# Patient Record
Sex: FEMALE | Race: WHITE | NOT HISPANIC OR LATINO
[De-identification: names, ages, dates, MRNs, and addresses within clinical notes are randomized per-mention and may not be internally consistent; named-entity substitution may affect disease eponyms.]

---

## 2018-01-10 ENCOUNTER — TRANSCRIPTION ENCOUNTER (OUTPATIENT)
Age: 33
End: 2018-01-10

## 2018-02-07 ENCOUNTER — TRANSCRIPTION ENCOUNTER (OUTPATIENT)
Age: 33
End: 2018-02-07

## 2018-08-08 ENCOUNTER — APPOINTMENT (OUTPATIENT)
Dept: DERMATOLOGY | Facility: CLINIC | Age: 33
End: 2018-08-08
Payer: COMMERCIAL

## 2018-08-08 VITALS
BODY MASS INDEX: 24.15 KG/M2 | HEIGHT: 60 IN | SYSTOLIC BLOOD PRESSURE: 98 MMHG | WEIGHT: 123 LBS | DIASTOLIC BLOOD PRESSURE: 66 MMHG

## 2018-08-08 DIAGNOSIS — L81.9 DISORDER OF PIGMENTATION, UNSPECIFIED: ICD-10-CM

## 2018-08-08 DIAGNOSIS — Z87.09 PERSONAL HISTORY OF OTHER DISEASES OF THE RESPIRATORY SYSTEM: ICD-10-CM

## 2018-08-08 DIAGNOSIS — Z91.89 OTHER SPECIFIED PERSONAL RISK FACTORS, NOT ELSEWHERE CLASSIFIED: ICD-10-CM

## 2018-08-08 DIAGNOSIS — L81.0 POSTINFLAMMATORY HYPERPIGMENTATION: ICD-10-CM

## 2018-08-08 DIAGNOSIS — D22.9 MELANOCYTIC NEVI, UNSPECIFIED: ICD-10-CM

## 2018-08-08 DIAGNOSIS — D22.4 MELANOCYTIC NEVI OF SCALP AND NECK: ICD-10-CM

## 2018-08-08 DIAGNOSIS — L81.2 FRECKLES: ICD-10-CM

## 2018-08-08 DIAGNOSIS — Z84.0 FAMILY HISTORY OF DISEASES OF THE SKIN AND SUBCUTANEOUS TISSUE: ICD-10-CM

## 2018-08-08 PROCEDURE — 99203 OFFICE O/P NEW LOW 30 MIN: CPT

## 2018-11-20 ENCOUNTER — TRANSCRIPTION ENCOUNTER (OUTPATIENT)
Age: 33
End: 2018-11-20

## 2019-01-08 ENCOUNTER — APPOINTMENT (OUTPATIENT)
Dept: PLASTIC SURGERY | Facility: CLINIC | Age: 34
End: 2019-01-08
Payer: COMMERCIAL

## 2019-01-08 VITALS — WEIGHT: 123 LBS | BODY MASS INDEX: 24.15 KG/M2 | HEIGHT: 60 IN

## 2019-01-08 PROCEDURE — 99203 OFFICE O/P NEW LOW 30 MIN: CPT

## 2019-03-28 ENCOUNTER — APPOINTMENT (OUTPATIENT)
Dept: PLASTIC SURGERY | Facility: CLINIC | Age: 34
End: 2019-03-28
Payer: COMMERCIAL

## 2019-03-28 DIAGNOSIS — H61.119 ACQUIRED DEFORMITY OF PINNA, UNSPECIFIED EAR: ICD-10-CM

## 2019-03-28 PROCEDURE — 14060 TIS TRNFR E/N/E/L 10 SQ CM/<: CPT

## 2019-04-04 ENCOUNTER — APPOINTMENT (OUTPATIENT)
Dept: PLASTIC SURGERY | Facility: CLINIC | Age: 34
End: 2019-04-04
Payer: COMMERCIAL

## 2019-04-04 PROCEDURE — 99024 POSTOP FOLLOW-UP VISIT: CPT

## 2019-04-04 NOTE — HISTORY OF PRESENT ILLNESS
[FreeTextEntry1] : DOP 03/28/2019 s/p reconstruction of Right torn earlobe. Pt is doing well; sutures in place.\par No complaints. SIte healing well per pt. No excessive bleeding. No fevers. No odor. No purulent discharge. No excessive pain.\par \par  \par

## 2019-04-11 PROBLEM — H61.119 ACQUIRED EAR DEFORMITY: Status: ACTIVE | Noted: 2019-01-08

## 2019-04-11 NOTE — PROCEDURE
[FreeTextEntry6] : preop dx: right ear deformity\par postop : same\par procedure: left ear reconstruction with local flap, 1cm x 8mm\par no specimens\par no abx\par \par IC obtained.  lido w/ epi injected.  15 blade used to de-epithelialize wound edges.  chondrocutaneous lateral portion of pinna advanced into defect [id number].  deep tissues closed with 5-0 monocryl.  skin reapproximated with 6-0 prolene horizontal mattresses.  baci placed.\par

## 2019-04-11 NOTE — REASON FOR VISIT
[Procedure: _________] : a [unfilled] procedure visit [FreeTextEntry1] : Patient presents to the office today for reconstruction of Right earlobe

## 2019-04-23 ENCOUNTER — RESULT REVIEW (OUTPATIENT)
Age: 34
End: 2019-04-23

## 2019-06-24 ENCOUNTER — TRANSCRIPTION ENCOUNTER (OUTPATIENT)
Age: 34
End: 2019-06-24

## 2019-09-25 ENCOUNTER — APPOINTMENT (OUTPATIENT)
Dept: INTERNAL MEDICINE | Facility: CLINIC | Age: 34
End: 2019-09-25

## 2019-10-18 ENCOUNTER — TRANSCRIPTION ENCOUNTER (OUTPATIENT)
Age: 34
End: 2019-10-18

## 2020-01-22 ENCOUNTER — TRANSCRIPTION ENCOUNTER (OUTPATIENT)
Age: 35
End: 2020-01-22

## 2020-04-25 ENCOUNTER — MESSAGE (OUTPATIENT)
Age: 35
End: 2020-04-25

## 2020-06-09 ENCOUNTER — APPOINTMENT (OUTPATIENT)
Dept: FAMILY MEDICINE | Facility: CLINIC | Age: 35
End: 2020-06-09

## 2020-06-19 ENCOUNTER — APPOINTMENT (OUTPATIENT)
Dept: FAMILY MEDICINE | Facility: CLINIC | Age: 35
End: 2020-06-19
Payer: COMMERCIAL

## 2020-06-19 VITALS
DIASTOLIC BLOOD PRESSURE: 70 MMHG | BODY MASS INDEX: 24.94 KG/M2 | SYSTOLIC BLOOD PRESSURE: 100 MMHG | HEART RATE: 68 BPM | OXYGEN SATURATION: 98 % | WEIGHT: 127 LBS | HEIGHT: 60 IN

## 2020-06-19 DIAGNOSIS — Z92.89 PERSONAL HISTORY OF OTHER MEDICAL TREATMENT: ICD-10-CM

## 2020-06-19 DIAGNOSIS — Z11.59 ENCOUNTER FOR SCREENING FOR OTHER VIRAL DISEASES: ICD-10-CM

## 2020-06-19 PROCEDURE — 99385 PREV VISIT NEW AGE 18-39: CPT | Mod: 25

## 2020-06-19 PROCEDURE — 36415 COLL VENOUS BLD VENIPUNCTURE: CPT

## 2020-06-19 NOTE — HISTORY OF PRESENT ILLNESS
[de-identified] : 35 year old female here to establish care and for a full physical examination. The patients complete medical, family and social history was documented and reviewed with the patient.  The patients medications were identified and also reviewed with the patient as well as any allergies to any medications. All active and previous medical problems were identified and discussed with the patient.  Any new problems were documented. Patient is feeling well today.\par

## 2020-06-19 NOTE — PHYSICAL EXAM
[Well Developed] : well developed [Well Nourished] : well nourished [Clear to Auscultation] : lungs were clear to auscultation bilaterally [Normal S1, S2] : normal S1 and S2 [Regular Rhythm] : with a regular rhythm [No Joint Swelling] : no joint swelling [Normal Gait] : normal gait [Normal Affect] : the affect was normal [Normal Insight/Judgement] : insight and judgment were intact

## 2020-06-19 NOTE — ASSESSMENT
[FreeTextEntry1] : Patient was counseled on healthy eating habits, on daily exercise and stress relief. All medications and allergies were reviewed with the patient and any adjustments necessary were made. Patient was counseled to try engage in an exercise activity for at least 30 minutes 3-4 times a week.  Patient was advised to eat a diet low in fat and carbohydrates and high in protein, with plenty of vegetables. Patient was advised to try and engage in relaxing activities whenever possible.\par The patients blood was draw in office and will be followed and assessed for any issues.  Patient was told to return to the office if any issues arise.  Unless otherwise stated, the patient is to continue all other medications as previously prescribed.\par \par history of anemia\par secondary to hemorrhoids \par will recheck

## 2020-06-19 NOTE — HEALTH RISK ASSESSMENT
[Excellent] : ~his/her~ current health as excellent [] : No [Yes] : Yes [No falls in past year] : Patient reported no falls in the past year [0] : 2) Feeling down, depressed, or hopeless: Not at all (0) [[id number]] : 0 [Patient reported colonoscopy was normal] : Patient reported colonoscopy was normal [With Significant Other] : lives with significant other [None] : None [Employed] : employed [Fully functional (bathing, dressing, toileting, transferring, walking, feeding)] : Fully functional (bathing, dressing, toileting, transferring, walking, feeding) [# Of Children ___] : has [unfilled] children [Fully functional (using the telephone, shopping, preparing meals, housekeeping, doing laundry, using] : Fully functional and needs no help or supervision to perform IADLs (using the telephone, shopping, preparing meals, housekeeping, doing laundry, using transportation, managing medications and managing finances) [Smoke Detector] : smoke detector [de-identified] : tia for [facility] [Seat Belt] :  uses seat belt [ColonoscopyDate] : 6/2019 [FreeTextEntry3] : engaged

## 2020-06-20 LAB
ALBUMIN SERPL ELPH-MCNC: 4.8 G/DL
ALP BLD-CCNC: 60 U/L
ALT SERPL-CCNC: 17 U/L
ANION GAP SERPL CALC-SCNC: 17 MMOL/L
APPEARANCE: CLEAR
AST SERPL-CCNC: 23 U/L
BASOPHILS # BLD AUTO: 0.04 K/UL
BASOPHILS NFR BLD AUTO: 0.8 %
BILIRUB SERPL-MCNC: 0.5 MG/DL
BILIRUBIN URINE: NEGATIVE
BLOOD URINE: NEGATIVE
BUN SERPL-MCNC: 12 MG/DL
CALCIUM SERPL-MCNC: 9.4 MG/DL
CHLORIDE SERPL-SCNC: 105 MMOL/L
CHOLEST SERPL-MCNC: 188 MG/DL
CHOLEST/HDLC SERPL: 2.4 RATIO
CO2 SERPL-SCNC: 21 MMOL/L
COLOR: YELLOW
CREAT SERPL-MCNC: 0.66 MG/DL
EOSINOPHIL # BLD AUTO: 0.04 K/UL
EOSINOPHIL NFR BLD AUTO: 0.8 %
FERRITIN SERPL-MCNC: 11 NG/ML
FOLATE SERPL-MCNC: 16.8 NG/ML
GLUCOSE QUALITATIVE U: NEGATIVE
GLUCOSE SERPL-MCNC: 85 MG/DL
HCT VFR BLD CALC: 44.3 %
HDLC SERPL-MCNC: 78 MG/DL
HGB BLD-MCNC: 13.6 G/DL
IMM GRANULOCYTES NFR BLD AUTO: 0.2 %
IRON SATN MFR SERPL: 19 %
IRON SERPL-MCNC: 74 UG/DL
KETONES URINE: NEGATIVE
LDLC SERPL CALC-MCNC: 101 MG/DL
LEUKOCYTE ESTERASE URINE: NEGATIVE
LYMPHOCYTES # BLD AUTO: 1.21 K/UL
LYMPHOCYTES NFR BLD AUTO: 24.5 %
MAN DIFF?: NORMAL
MCHC RBC-ENTMCNC: 30.1 PG
MCHC RBC-ENTMCNC: 30.7 GM/DL
MCV RBC AUTO: 98 FL
MONOCYTES # BLD AUTO: 0.47 K/UL
MONOCYTES NFR BLD AUTO: 9.5 %
NEUTROPHILS # BLD AUTO: 3.16 K/UL
NEUTROPHILS NFR BLD AUTO: 64.2 %
NITRITE URINE: NEGATIVE
PH URINE: 6.5
PLATELET # BLD AUTO: 221 K/UL
POTASSIUM SERPL-SCNC: 4.5 MMOL/L
PROT SERPL-MCNC: 7.1 G/DL
PROTEIN URINE: NORMAL
RBC # BLD: 4.52 M/UL
RBC # FLD: 14.9 %
SARS-COV-2 IGG SERPL IA-ACNC: 0.1 INDEX
SARS-COV-2 IGG SERPL QL IA: NEGATIVE
SODIUM SERPL-SCNC: 143 MMOL/L
SPECIFIC GRAVITY URINE: 1.03
TIBC SERPL-MCNC: 396 UG/DL
TRIGL SERPL-MCNC: 48 MG/DL
TSH SERPL-ACNC: 1.08 UIU/ML
UIBC SERPL-MCNC: 322 UG/DL
UROBILINOGEN URINE: NORMAL
VIT B12 SERPL-MCNC: 410 PG/ML
WBC # FLD AUTO: 4.93 K/UL

## 2020-11-11 ENCOUNTER — TRANSCRIPTION ENCOUNTER (OUTPATIENT)
Age: 35
End: 2020-11-11

## 2020-11-16 ENCOUNTER — APPOINTMENT (OUTPATIENT)
Dept: FAMILY MEDICINE | Facility: CLINIC | Age: 35
End: 2020-11-16

## 2020-12-07 ENCOUNTER — APPOINTMENT (OUTPATIENT)
Dept: FAMILY MEDICINE | Facility: CLINIC | Age: 35
End: 2020-12-07
Payer: COMMERCIAL

## 2020-12-07 PROCEDURE — 99213 OFFICE O/P EST LOW 20 MIN: CPT | Mod: 95

## 2020-12-07 NOTE — REVIEW OF SYSTEMS
[Earache] : earache [Nasal Discharge] : nasal discharge [Sore Throat] : no sore throat [Negative] : Heme/Lymph

## 2020-12-07 NOTE — HEALTH RISK ASSESSMENT
[] : No [Yes] : Yes [No falls in past year] : Patient reported no falls in the past year [0] : 2) Feeling down, depressed, or hopeless: Not at all (0) [[id number]] : 0 [Excellent] : ~his/her~  mood as  excellent [Patient reported colonoscopy was normal] : Patient reported colonoscopy was normal [None] : None [With Significant Other] : lives with significant other [Employed] : employed [# Of Children ___] : has [unfilled] children [Fully functional (bathing, dressing, toileting, transferring, walking, feeding)] : Fully functional (bathing, dressing, toileting, transferring, walking, feeding) [Fully functional (using the telephone, shopping, preparing meals, housekeeping, doing laundry, using] : Fully functional and needs no help or supervision to perform IADLs (using the telephone, shopping, preparing meals, housekeeping, doing laundry, using transportation, managing medications and managing finances) [Smoke Detector] : smoke detector [Seat Belt] :  uses seat belt [ColonoscopyDate] : 6/2019 [de-identified] : tia for [facility] [FreeTextEntry3] : engaged

## 2020-12-07 NOTE — HISTORY OF PRESENT ILLNESS
[Home] : at home, [unfilled] , at the time of the visit. [Medical Office: ([facility])___] : at the medical office located in  [Verbal consent obtained from patient] : the patient, [unfilled] [de-identified] : 35 year old female here with complaints of throat soreness, ear pain. Patients active medications, allergies and issues were all reviewed with the patient at time of visit.\par

## 2020-12-07 NOTE — ASSESSMENT
[FreeTextEntry1] : uri\par The symptoms that are occurring are most likely secondary to virus, therefore antibiotics are deferred at this time. Patient was told to rest, hydrate and treat symptoms as necessary. May use tylenol/ibuprofen as necessary for symptomatic relief.  If symptoms worsen or do not improve return to this office, seek care or go directly to the ER.\par

## 2020-12-07 NOTE — PHYSICAL EXAM
[No Acute Distress] : no acute distress [Well Nourished] : well nourished [Well Developed] : well developed [Normal Gait] : normal gait [Normal Affect] : the affect was normal [Normal Insight/Judgement] : insight and judgment were intact

## 2020-12-12 ENCOUNTER — TRANSCRIPTION ENCOUNTER (OUTPATIENT)
Age: 35
End: 2020-12-12

## 2020-12-15 ENCOUNTER — APPOINTMENT (OUTPATIENT)
Dept: FAMILY MEDICINE | Facility: CLINIC | Age: 35
End: 2020-12-15
Payer: COMMERCIAL

## 2020-12-15 PROCEDURE — 99213 OFFICE O/P EST LOW 20 MIN: CPT | Mod: 95

## 2020-12-15 RX ORDER — METHYLPREDNISOLONE 4 MG/1
4 TABLET ORAL
Qty: 21 | Refills: 0 | Status: DISCONTINUED | COMMUNITY
Start: 2020-12-07 | End: 2020-12-15

## 2020-12-15 NOTE — HEALTH RISK ASSESSMENT
[] : No [Yes] : Yes [No falls in past year] : Patient reported no falls in the past year [0] : 2) Feeling down, depressed, or hopeless: Not at all (0) [[id number]] : 0 [Excellent] : ~his/her~  mood as  excellent [Patient reported colonoscopy was normal] : Patient reported colonoscopy was normal [None] : None [With Significant Other] : lives with significant other [Employed] : employed [# Of Children ___] : has [unfilled] children [Fully functional (bathing, dressing, toileting, transferring, walking, feeding)] : Fully functional (bathing, dressing, toileting, transferring, walking, feeding) [Fully functional (using the telephone, shopping, preparing meals, housekeeping, doing laundry, using] : Fully functional and needs no help or supervision to perform IADLs (using the telephone, shopping, preparing meals, housekeeping, doing laundry, using transportation, managing medications and managing finances) [Smoke Detector] : smoke detector [Seat Belt] :  uses seat belt [ColonoscopyDate] : 6/2019 [de-identified] : tia for [facility] [FreeTextEntry3] : engaged

## 2020-12-15 NOTE — REVIEW OF SYSTEMS
[Earache] : earache [Nasal Discharge] : nasal discharge [Sore Throat] : sore throat [Negative] : Heme/Lymph

## 2020-12-15 NOTE — HISTORY OF PRESENT ILLNESS
[de-identified] : 35 year old female here with complaints of throat soreness, ear pain, tested positive for covid. Patients active medications, allergies and issues were all reviewed with the patient at time of visit.\par  [Home] : at home, [unfilled] , at the time of the visit. [Medical Office: ([facility])___] : at the medical office located in  [Verbal consent obtained from patient] : the patient, [unfilled]

## 2020-12-15 NOTE — ASSESSMENT
[FreeTextEntry1] : Patient called with symptoms that can be associated with the COVID-19 virus, tested positive 12/10/20\par went to the ER, was diagnosed with abscess of her throat, covid 19\par \par Patient was advised at this time, to use symptomatic treatment.  Use Tylenol for fever and body aches.  Hydrate throughout the day.  Use cough medicine as needed. \par Patient advised to self isolate, avoid contact with others, stay home for 14 days\par \par augmentin \par will add on prednisone\par \par  All concerns were addressed and pertinent questions answered. If symptoms progress and become unmanageable at home, patient was advised to call the office for reassessment or to go directly to the ER for full assessment.\par \par .\par

## 2020-12-22 ENCOUNTER — APPOINTMENT (OUTPATIENT)
Dept: FAMILY MEDICINE | Facility: CLINIC | Age: 35
End: 2020-12-22
Payer: COMMERCIAL

## 2020-12-22 PROCEDURE — 99213 OFFICE O/P EST LOW 20 MIN: CPT | Mod: 95

## 2020-12-22 NOTE — HEALTH RISK ASSESSMENT
[] : No [Yes] : Yes [No falls in past year] : Patient reported no falls in the past year [0] : 2) Feeling down, depressed, or hopeless: Not at all (0) [[id number]] : 0 [Excellent] : ~his/her~  mood as  excellent [Patient reported colonoscopy was normal] : Patient reported colonoscopy was normal [None] : None [With Significant Other] : lives with significant other [Employed] : employed [# Of Children ___] : has [unfilled] children [Fully functional (bathing, dressing, toileting, transferring, walking, feeding)] : Fully functional (bathing, dressing, toileting, transferring, walking, feeding) [Fully functional (using the telephone, shopping, preparing meals, housekeeping, doing laundry, using] : Fully functional and needs no help or supervision to perform IADLs (using the telephone, shopping, preparing meals, housekeeping, doing laundry, using transportation, managing medications and managing finances) [Smoke Detector] : smoke detector [Seat Belt] :  uses seat belt [ColonoscopyDate] : 6/2019 [de-identified] : tia for [facility] [FreeTextEntry3] : engaged

## 2020-12-22 NOTE — HISTORY OF PRESENT ILLNESS
[de-identified] : 35 year old female here with complaints of throat soreness, ear pain, tested positive for covid, finishing medications and wanted to follow up. Patients active medications, allergies and issues were all reviewed with the patient at time of visit.\par  [Home] : at home, [unfilled] , at the time of the visit. [Medical Office: ([facility])___] : at the medical office located in  [Verbal consent obtained from patient] : the patient, [unfilled]

## 2020-12-22 NOTE — ASSESSMENT
[FreeTextEntry1] : Patient called with symptoms that can be associated with the COVID-19 virus, tested positive 12/10/20\par went to the ER, was diagnosed with abscess of her throat, covid 19\par \par finished augmentin and prednisone\par \par feeling better, may return to work\par fu with ent\par .\par

## 2021-01-08 ENCOUNTER — APPOINTMENT (OUTPATIENT)
Dept: OTOLARYNGOLOGY | Facility: CLINIC | Age: 36
End: 2021-01-08

## 2021-06-11 ENCOUNTER — APPOINTMENT (OUTPATIENT)
Dept: DERMATOLOGY | Facility: CLINIC | Age: 36
End: 2021-06-11
Payer: COMMERCIAL

## 2021-06-11 DIAGNOSIS — L65.9 NONSCARRING HAIR LOSS, UNSPECIFIED: ICD-10-CM

## 2021-06-11 DIAGNOSIS — L30.9 DERMATITIS, UNSPECIFIED: ICD-10-CM

## 2021-06-11 PROCEDURE — 99213 OFFICE O/P EST LOW 20 MIN: CPT | Mod: GC

## 2021-06-11 PROCEDURE — 99072 ADDL SUPL MATRL&STAF TM PHE: CPT

## 2021-07-09 ENCOUNTER — APPOINTMENT (OUTPATIENT)
Dept: DERMATOLOGY | Facility: CLINIC | Age: 36
End: 2021-07-09

## 2021-10-07 ENCOUNTER — APPOINTMENT (OUTPATIENT)
Dept: DERMATOLOGY | Facility: CLINIC | Age: 36
End: 2021-10-07

## 2021-10-13 ENCOUNTER — APPOINTMENT (OUTPATIENT)
Dept: FAMILY MEDICINE | Facility: CLINIC | Age: 36
End: 2021-10-13
Payer: COMMERCIAL

## 2021-10-13 VITALS
HEIGHT: 60 IN | WEIGHT: 128 LBS | SYSTOLIC BLOOD PRESSURE: 102 MMHG | TEMPERATURE: 98.6 F | HEART RATE: 74 BPM | DIASTOLIC BLOOD PRESSURE: 72 MMHG | BODY MASS INDEX: 25.13 KG/M2 | OXYGEN SATURATION: 97 %

## 2021-10-13 PROCEDURE — 99213 OFFICE O/P EST LOW 20 MIN: CPT | Mod: 25

## 2021-10-13 NOTE — HEALTH RISK ASSESSMENT
[] : No [Yes] : Yes [No falls in past year] : Patient reported no falls in the past year [0] : 2) Feeling down, depressed, or hopeless: Not at all (0) [XME6Mmcrc] : 0 [Excellent] : ~his/her~  mood as  excellent [Patient reported colonoscopy was normal] : Patient reported colonoscopy was normal [None] : None [With Significant Other] : lives with significant other [Employed] : employed [# Of Children ___] : has [unfilled] children [Fully functional (bathing, dressing, toileting, transferring, walking, feeding)] : Fully functional (bathing, dressing, toileting, transferring, walking, feeding) [Fully functional (using the telephone, shopping, preparing meals, housekeeping, doing laundry, using] : Fully functional and needs no help or supervision to perform IADLs (using the telephone, shopping, preparing meals, housekeeping, doing laundry, using transportation, managing medications and managing finances) [Smoke Detector] : smoke detector [Seat Belt] :  uses seat belt [ColonoscopyDate] : 6/2019 [de-identified] : tia for [facility] [FreeTextEntry3] : engaged

## 2021-10-13 NOTE — HISTORY OF PRESENT ILLNESS
[de-identified] : 36 year old female here with complaints of sore throat and congestion for two days.  Patients active medications, allergies and issues were all reviewed with the patient at time of visit.\par

## 2021-10-13 NOTE — ASSESSMENT
[FreeTextEntry1] : previous covid;  tested positive 12/10/20\par \par uri\par The symptoms that are occurring are most likely secondary to virus, therefore antibiotics are deferred at this time. Patient was told to rest, hydrate and treat symptoms as necessary. May use tylenol/ibuprofen as necessary for symptomatic relief.  If symptoms worsen or do not improve return to this office, seek care or go directly to the ER.\par \par swab done for covid

## 2021-10-14 LAB — SARS-COV-2 N GENE NPH QL NAA+PROBE: NOT DETECTED

## 2021-10-20 ENCOUNTER — APPOINTMENT (OUTPATIENT)
Dept: FAMILY MEDICINE | Facility: CLINIC | Age: 36
End: 2021-10-20
Payer: COMMERCIAL

## 2021-10-20 VITALS
HEART RATE: 56 BPM | TEMPERATURE: 98.2 F | OXYGEN SATURATION: 97 % | BODY MASS INDEX: 25.13 KG/M2 | DIASTOLIC BLOOD PRESSURE: 74 MMHG | SYSTOLIC BLOOD PRESSURE: 115 MMHG | HEIGHT: 60 IN | WEIGHT: 128 LBS

## 2021-10-20 PROCEDURE — G0008: CPT

## 2021-10-20 PROCEDURE — 90686 IIV4 VACC NO PRSV 0.5 ML IM: CPT

## 2021-10-20 PROCEDURE — 99395 PREV VISIT EST AGE 18-39: CPT | Mod: 25

## 2021-10-20 PROCEDURE — 36415 COLL VENOUS BLD VENIPUNCTURE: CPT

## 2021-10-20 RX ORDER — PREDNISONE 50 MG/1
50 TABLET ORAL DAILY
Qty: 5 | Refills: 0 | Status: DISCONTINUED | COMMUNITY
Start: 2020-12-15 | End: 2021-10-20

## 2021-10-20 NOTE — ASSESSMENT
[FreeTextEntry1] : Patient was counseled on healthy eating habits, on daily exercise and stress relief. All medications and allergies were reviewed with the patient and any adjustments necessary were made. Patient was counseled to try engage in an exercise activity for at least 30 minutes 3-4 times a week.  Patient was advised to eat a diet low in fat and carbohydrates and high in protein, with plenty of vegetables. Patient was advised to try and engage in relaxing activities whenever possible.\par The patients blood was draw in office and will be followed and assessed for any issues.  Patient was told to return to the office if any issues arise.  Unless otherwise stated, the patient is to continue all other medications as previously prescribed.\par \par Patient called with symptoms that can be associated with the COVID-19 virus, tested positive 12/10/20\par went to the ER, was diagnosed with abscess of her throat, covid 19\par \par finished augmentin and prednisone\par \par feeling better, may return to work\par fu with ent\par .\par

## 2021-10-20 NOTE — HEALTH RISK ASSESSMENT
[Excellent] : ~his/her~  mood as  excellent [] : No [Yes] : Yes [No falls in past year] : Patient reported no falls in the past year [0] : 2) Feeling down, depressed, or hopeless: Not at all (0) [[id number]] : 0 [Patient reported colonoscopy was normal] : Patient reported colonoscopy was normal [None] : None [With Significant Other] : lives with significant other [Employed] : employed [# Of Children ___] : has [unfilled] children [Fully functional (bathing, dressing, toileting, transferring, walking, feeding)] : Fully functional (bathing, dressing, toileting, transferring, walking, feeding) [Fully functional (using the telephone, shopping, preparing meals, housekeeping, doing laundry, using] : Fully functional and needs no help or supervision to perform IADLs (using the telephone, shopping, preparing meals, housekeeping, doing laundry, using transportation, managing medications and managing finances) [Smoke Detector] : smoke detector [Seat Belt] :  uses seat belt [ColonoscopyDate] : 6/2019 [de-identified] : tia for [facility] [FreeTextEntry3] : engaged

## 2021-10-20 NOTE — HISTORY OF PRESENT ILLNESS
[de-identified] : \par 36 year old female  here for annual well visit. Patient's blood work was drawn and medications reviewed. Patient's past medical history was reviewed, allergies verified and problems were identified and assessed. Patients medications were reviewed. Patient is feeling well with no new or active complaints at this time.\par

## 2021-10-21 LAB
ALBUMIN SERPL ELPH-MCNC: 4.4 G/DL
ALP BLD-CCNC: 59 U/L
ALT SERPL-CCNC: 18 U/L
ANION GAP SERPL CALC-SCNC: 11 MMOL/L
AST SERPL-CCNC: 17 U/L
BASOPHILS # BLD AUTO: 0.04 K/UL
BASOPHILS NFR BLD AUTO: 0.8 %
BILIRUB SERPL-MCNC: 0.3 MG/DL
BUN SERPL-MCNC: 14 MG/DL
CALCIUM SERPL-MCNC: 9.2 MG/DL
CHLORIDE SERPL-SCNC: 108 MMOL/L
CHOLEST SERPL-MCNC: 162 MG/DL
CO2 SERPL-SCNC: 23 MMOL/L
CREAT SERPL-MCNC: 0.6 MG/DL
EOSINOPHIL # BLD AUTO: 0.25 K/UL
EOSINOPHIL NFR BLD AUTO: 5.3 %
GLUCOSE SERPL-MCNC: 83 MG/DL
HCT VFR BLD CALC: 41.2 %
HDLC SERPL-MCNC: 75 MG/DL
HGB BLD-MCNC: 13.1 G/DL
IMM GRANULOCYTES NFR BLD AUTO: 0.2 %
LDLC SERPL CALC-MCNC: 80 MG/DL
LYMPHOCYTES # BLD AUTO: 1.36 K/UL
LYMPHOCYTES NFR BLD AUTO: 28.7 %
MAN DIFF?: NORMAL
MCHC RBC-ENTMCNC: 31.8 GM/DL
MCHC RBC-ENTMCNC: 32.3 PG
MCV RBC AUTO: 101.5 FL
MONOCYTES # BLD AUTO: 0.46 K/UL
MONOCYTES NFR BLD AUTO: 9.7 %
NEUTROPHILS # BLD AUTO: 2.62 K/UL
NEUTROPHILS NFR BLD AUTO: 55.3 %
NONHDLC SERPL-MCNC: 87 MG/DL
PLATELET # BLD AUTO: 201 K/UL
POTASSIUM SERPL-SCNC: 4 MMOL/L
PROT SERPL-MCNC: 6.4 G/DL
RBC # BLD: 4.06 M/UL
RBC # FLD: 13.2 %
SODIUM SERPL-SCNC: 142 MMOL/L
TRIGL SERPL-MCNC: 33 MG/DL
TSH SERPL-ACNC: 1.73 UIU/ML
WBC # FLD AUTO: 4.74 K/UL

## 2021-12-14 ENCOUNTER — APPOINTMENT (OUTPATIENT)
Dept: FAMILY MEDICINE | Facility: CLINIC | Age: 36
End: 2021-12-14
Payer: COMMERCIAL

## 2021-12-14 VITALS
SYSTOLIC BLOOD PRESSURE: 102 MMHG | HEART RATE: 75 BPM | BODY MASS INDEX: 24.74 KG/M2 | OXYGEN SATURATION: 95 % | WEIGHT: 126 LBS | HEIGHT: 60 IN | DIASTOLIC BLOOD PRESSURE: 70 MMHG

## 2021-12-14 PROCEDURE — 99213 OFFICE O/P EST LOW 20 MIN: CPT

## 2021-12-14 NOTE — ASSESSMENT
[FreeTextEntry1] : previous covid;  tested positive 12/10/20\par \par uri\par The symptoms that are occurring are most likely secondary to virus, therefore antibiotics are deferred at this time. Patient was told to rest, hydrate and treat symptoms as necessary. May use tylenol/ibuprofen as necessary for symptomatic relief.  If symptoms worsen or do not improve return to this office, seek care or go directly to the ER.\par \par swab done for covid/resp

## 2021-12-14 NOTE — HEALTH RISK ASSESSMENT
[Yes] : Yes [No falls in past year] : Patient reported no falls in the past year [0] : 2) Feeling down, depressed, or hopeless: Not at all (0) [[id number]] : 0 [Excellent] : ~his/her~  mood as  excellent [Patient reported colonoscopy was normal] : Patient reported colonoscopy was normal [None] : None [With Significant Other] : lives with significant other [Employed] : employed [# Of Children ___] : has [unfilled] children [Fully functional (bathing, dressing, toileting, transferring, walking, feeding)] : Fully functional (bathing, dressing, toileting, transferring, walking, feeding) [Fully functional (using the telephone, shopping, preparing meals, housekeeping, doing laundry, using] : Fully functional and needs no help or supervision to perform IADLs (using the telephone, shopping, preparing meals, housekeeping, doing laundry, using transportation, managing medications and managing finances) [Smoke Detector] : smoke detector [Seat Belt] :  uses seat belt [ColonoscopyDate] : 6/2019 [de-identified] : tia for [facility] [FreeTextEntry3] : engaged

## 2021-12-14 NOTE — HISTORY OF PRESENT ILLNESS
[de-identified] : 36 year old female here with complaints of sore throat and congestion for two days.  Patients active medications, allergies and issues were all reviewed with the patient at time of visit.\par

## 2021-12-16 LAB
RAPID RVP RESULT: DETECTED
RV+EV RNA SPEC QL NAA+PROBE: DETECTED
SARS-COV-2 RNA PNL RESP NAA+PROBE: NOT DETECTED

## 2021-12-28 ENCOUNTER — APPOINTMENT (OUTPATIENT)
Dept: FAMILY MEDICINE | Facility: CLINIC | Age: 36
End: 2021-12-28
Payer: COMMERCIAL

## 2021-12-28 VITALS
HEART RATE: 77 BPM | SYSTOLIC BLOOD PRESSURE: 115 MMHG | BODY MASS INDEX: 24.74 KG/M2 | HEIGHT: 60 IN | OXYGEN SATURATION: 99 % | DIASTOLIC BLOOD PRESSURE: 68 MMHG | TEMPERATURE: 98.5 F | WEIGHT: 126 LBS

## 2021-12-28 PROCEDURE — 99213 OFFICE O/P EST LOW 20 MIN: CPT

## 2021-12-28 NOTE — HISTORY OF PRESENT ILLNESS
[de-identified] : 36 year old female here with complaints of sore throat and congestion for two days.  Patients active medications, allergies and issues were all reviewed with the patient at time of visit.\par

## 2021-12-28 NOTE — HEALTH RISK ASSESSMENT
[Yes] : Yes [No falls in past year] : Patient reported no falls in the past year [0] : 2) Feeling down, depressed, or hopeless: Not at all (0) [ICZ4Wubeu] : 0 [Excellent] : ~his/her~  mood as  excellent [Patient reported colonoscopy was normal] : Patient reported colonoscopy was normal [None] : None [With Significant Other] : lives with significant other [Employed] : employed [# Of Children ___] : has [unfilled] children [Fully functional (bathing, dressing, toileting, transferring, walking, feeding)] : Fully functional (bathing, dressing, toileting, transferring, walking, feeding) [Fully functional (using the telephone, shopping, preparing meals, housekeeping, doing laundry, using] : Fully functional and needs no help or supervision to perform IADLs (using the telephone, shopping, preparing meals, housekeeping, doing laundry, using transportation, managing medications and managing finances) [Smoke Detector] : smoke detector [Seat Belt] :  uses seat belt [ColonoscopyDate] : 6/2019 [de-identified] : tia for [facility] [FreeTextEntry3] : engaged

## 2021-12-30 LAB
RAPID RVP RESULT: DETECTED
SARS-COV-2 RNA PNL RESP NAA+PROBE: DETECTED

## 2022-01-05 ENCOUNTER — TRANSCRIPTION ENCOUNTER (OUTPATIENT)
Age: 37
End: 2022-01-05

## 2022-04-11 PROBLEM — Z11.59 SCREENING FOR VIRAL DISEASE: Status: ACTIVE | Noted: 2020-06-19

## 2022-06-02 ENCOUNTER — APPOINTMENT (OUTPATIENT)
Dept: ORTHOPEDIC SURGERY | Facility: CLINIC | Age: 37
End: 2022-06-02

## 2022-06-02 VITALS — WEIGHT: 130 LBS | BODY MASS INDEX: 25.52 KG/M2 | HEIGHT: 60 IN

## 2022-06-02 DIAGNOSIS — M54.12 RADICULOPATHY, CERVICAL REGION: ICD-10-CM

## 2022-06-02 PROCEDURE — 99214 OFFICE O/P EST MOD 30 MIN: CPT

## 2022-06-02 NOTE — IMAGING
[de-identified] : Strength:\par Deltoid: 5/5 b/l\par Biceps: 5/5 b/l\par Tricep: 5/5 b/l\par Wrist flexor/extensors: 5/5 b/l\par Interosseous: 5/5 b/l\par Grasp: 5/5 b/l\par \par Sensation: Grossly Intact\par Hoffmans: Negative\par

## 2022-06-02 NOTE — ASSESSMENT
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

## 2022-06-02 NOTE — HISTORY OF PRESENT ILLNESS
[Gradual] : gradual [8] : 8 [5] : 5 [Dull/Aching] : dull/aching [Constant] : constant [Nothing helps with pain getting better] : Nothing helps with pain getting better [Sitting] : sitting [Full time] : Work status: full time [de-identified] : 38 y/o F presenting with right sided thoracic pain. Had one episode where neck was stiff and was unable to move it which resolved. States pain is from the neck and goes to both shoulder blade. Has done PT twice with no relieve. Has had MRI and XRAY in past. Takes advil if need be. States pain has been worsening. [] : no [FreeTextEntry5] : pt states she has been experiencing pain on her back for the past 10 years due to an accident.   [FreeTextEntry7] : neck / shoulder blade  [de-identified] : movement

## 2022-06-09 ENCOUNTER — FORM ENCOUNTER (OUTPATIENT)
Age: 37
End: 2022-06-09

## 2022-06-10 ENCOUNTER — APPOINTMENT (OUTPATIENT)
Dept: MRI IMAGING | Facility: CLINIC | Age: 37
End: 2022-06-10

## 2022-06-10 ENCOUNTER — APPOINTMENT (OUTPATIENT)
Dept: MRI IMAGING | Facility: CLINIC | Age: 37
End: 2022-06-10
Payer: COMMERCIAL

## 2022-06-10 PROCEDURE — ZZZZZ: CPT

## 2022-06-10 PROCEDURE — 72141 MRI NECK SPINE W/O DYE: CPT

## 2022-06-10 PROCEDURE — 72146 MRI CHEST SPINE W/O DYE: CPT

## 2022-06-20 ENCOUNTER — APPOINTMENT (OUTPATIENT)
Dept: ORTHOPEDIC SURGERY | Facility: CLINIC | Age: 37
End: 2022-06-20
Payer: COMMERCIAL

## 2022-06-20 DIAGNOSIS — M50.20 OTHER CERVICAL DISC DISPLACEMENT, UNSPECIFIED CERVICAL REGION: ICD-10-CM

## 2022-06-20 PROCEDURE — 99214 OFFICE O/P EST MOD 30 MIN: CPT

## 2022-06-20 NOTE — ASSESSMENT
[FreeTextEntry1] : periscapular pain;  has completed months and months of PT for this with marginal success;\par \par MRI shows a well defined left sided 5-6 herniation that is the presumed pain generator;  the T spine MRi was completely normal;  I proposed more HEP and acupuncture, massage,  chiro,  BUT encouraged pain colleaages eval for  TAYLOR and MBB PRN;  if those measures fall short then a C5-6 disc replacement would be very well indicated;

## 2022-06-24 ENCOUNTER — APPOINTMENT (OUTPATIENT)
Dept: FAMILY MEDICINE | Facility: CLINIC | Age: 37
End: 2022-06-24

## 2022-10-26 ENCOUNTER — APPOINTMENT (OUTPATIENT)
Dept: FAMILY MEDICINE | Facility: CLINIC | Age: 37
End: 2022-10-26

## 2022-10-26 VITALS
DIASTOLIC BLOOD PRESSURE: 75 MMHG | OXYGEN SATURATION: 99 % | HEIGHT: 60 IN | BODY MASS INDEX: 25.13 KG/M2 | HEART RATE: 69 BPM | WEIGHT: 128 LBS | SYSTOLIC BLOOD PRESSURE: 118 MMHG

## 2022-10-26 DIAGNOSIS — Z11.3 ENCOUNTER FOR SCREENING FOR INFECTIONS WITH A PREDOMINANTLY SEXUAL MODE OF TRANSMISSION: ICD-10-CM

## 2022-10-26 DIAGNOSIS — Z86.2 PERSONAL HISTORY OF DISEASES OF THE BLOOD AND BLOOD-FORMING ORGANS AND CERTAIN DISORDERS INVOLVING THE IMMUNE MECHANISM: ICD-10-CM

## 2022-10-26 DIAGNOSIS — J36 PERITONSILLAR ABSCESS: ICD-10-CM

## 2022-10-26 DIAGNOSIS — Z00.00 ENCOUNTER FOR GENERAL ADULT MEDICAL EXAMINATION W/OUT ABNORMAL FINDINGS: ICD-10-CM

## 2022-10-26 DIAGNOSIS — J06.9 ACUTE UPPER RESPIRATORY INFECTION, UNSPECIFIED: ICD-10-CM

## 2022-10-26 DIAGNOSIS — Z23 ENCOUNTER FOR IMMUNIZATION: ICD-10-CM

## 2022-10-26 DIAGNOSIS — U07.1 COVID-19: ICD-10-CM

## 2022-10-26 DIAGNOSIS — R19.7 DIARRHEA, UNSPECIFIED: ICD-10-CM

## 2022-10-26 PROCEDURE — 90686 IIV4 VACC NO PRSV 0.5 ML IM: CPT

## 2022-10-26 PROCEDURE — 99395 PREV VISIT EST AGE 18-39: CPT | Mod: 25

## 2022-10-26 PROCEDURE — 36415 COLL VENOUS BLD VENIPUNCTURE: CPT

## 2022-10-26 PROCEDURE — G0008: CPT

## 2022-10-26 RX ORDER — TRIAMCINOLONE ACETONIDE 1 MG/G
0.1 OINTMENT TOPICAL
Qty: 1 | Refills: 1 | Status: DISCONTINUED | COMMUNITY
Start: 2021-06-11 | End: 2022-10-26

## 2022-10-26 RX ORDER — METHYLPREDNISOLONE 4 MG/1
4 TABLET ORAL
Qty: 21 | Refills: 0 | Status: DISCONTINUED | COMMUNITY
Start: 2021-12-14 | End: 2022-10-26

## 2022-10-26 RX ORDER — FLUTICASONE PROPIONATE 50 UG/1
50 SPRAY, METERED NASAL TWICE DAILY
Qty: 1 | Refills: 3 | Status: DISCONTINUED | COMMUNITY
Start: 2020-12-07 | End: 2022-10-26

## 2022-10-26 RX ORDER — MONTELUKAST 10 MG/1
10 TABLET, FILM COATED ORAL
Qty: 1 | Refills: 1 | Status: DISCONTINUED | COMMUNITY
Start: 2021-10-20 | End: 2022-10-26

## 2022-10-26 NOTE — PAST MEDICAL HISTORY
[Menstruating] : menstruating [Regular Cycle Intervals] : have been regular [Total Preg ___] : G[unfilled] [Live Births ___] : P[unfilled]  [Full Term ___] : Full Term: [unfilled] [Abortions ___] : Abortions:[unfilled] [AB Spont ___] : miscarriages: [unfilled]

## 2022-10-26 NOTE — PHYSICAL EXAM
[No Acute Distress] : no acute distress [Well Nourished] : well nourished [Well Developed] : well developed [Well-Appearing] : well-appearing [Normal Sclera/Conjunctiva] : normal sclera/conjunctiva [PERRL] : pupils equal round and reactive to light [EOMI] : extraocular movements intact [Normal Outer Ear/Nose] : the outer ears and nose were normal in appearance [Normal Oropharynx] : the oropharynx was normal [Normal TMs] : both tympanic membranes were normal [No JVD] : no jugular venous distention [No Lymphadenopathy] : no lymphadenopathy [Supple] : supple [Thyroid Normal, No Nodules] : the thyroid was normal and there were no nodules present [No Respiratory Distress] : no respiratory distress  [No Accessory Muscle Use] : no accessory muscle use [Clear to Auscultation] : lungs were clear to auscultation bilaterally [Normal Rate] : normal rate  [Regular Rhythm] : with a regular rhythm [Normal S1, S2] : normal S1 and S2 [No Murmur] : no murmur heard [No Carotid Bruits] : no carotid bruits [No Abdominal Bruit] : a ~M bruit was not heard ~T in the abdomen [No Varicosities] : no varicosities [Pedal Pulses Present] : the pedal pulses are present [No Edema] : there was no peripheral edema [No Palpable Aorta] : no palpable aorta [No Extremity Clubbing/Cyanosis] : no extremity clubbing/cyanosis [Declined Breast Exam] : declined breast exam  [Soft] : abdomen soft [Non Tender] : non-tender [Non-distended] : non-distended [No Masses] : no abdominal mass palpated [No HSM] : no HSM [Normal Bowel Sounds] : normal bowel sounds [Normal Posterior Cervical Nodes] : no posterior cervical lymphadenopathy [Normal Anterior Cervical Nodes] : no anterior cervical lymphadenopathy [No CVA Tenderness] : no CVA  tenderness [No Spinal Tenderness] : no spinal tenderness [No Joint Swelling] : no joint swelling [Grossly Normal Strength/Tone] : grossly normal strength/tone [No Rash] : no rash [Coordination Grossly Intact] : coordination grossly intact [No Focal Deficits] : no focal deficits [Normal Gait] : normal gait [Normal Affect] : the affect was normal [Alert and Oriented x3] : oriented to person, place, and time [Normal Insight/Judgement] : insight and judgment were intact [de-identified] : will do at gyn office

## 2022-10-26 NOTE — HEALTH RISK ASSESSMENT
[Good] : ~his/her~  mood as  good [Never] : Never [Yes] : Yes [Monthly or less (1 pt)] : Monthly or less (1 point) [1 or 2 (0 pts)] : 1 or 2 (0 points) [Never (0 pts)] : Never (0 points) [0] : 2) Feeling down, depressed, or hopeless: Not at all (0) [With Family] : lives with family [# of Members in Household ___] :  household currently consist of [unfilled] member(s) [Employed] : employed [Significant Other] : lives with significant other [# Of Children ___] : has [unfilled] children [Sexually Active] : sexually active [Feels Safe at Home] : Feels safe at home [Fully functional (bathing, dressing, toileting, transferring, walking, feeding)] : Fully functional (bathing, dressing, toileting, transferring, walking, feeding) [Fully functional (using the telephone, shopping, preparing meals, housekeeping, doing laundry, using] : Fully functional and needs no help or supervision to perform IADLs (using the telephone, shopping, preparing meals, housekeeping, doing laundry, using transportation, managing medications and managing finances) [Smoke Detector] : smoke detector [Seat Belt] :  uses seat belt [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 [de-identified] : exercises daily, cardio and wgts [de-identified] : no restrictions [Patient declined mammogram] : Patient declined mammogram [Change in mental status noted] : No change in mental status noted [Language] : denies difficulty with language [Handling Complex Tasks] : denies difficulty handling complex tasks [Reports changes in hearing] : Reports no changes in hearing [Reports changes in vision] : Reports no changes in vision [Reports changes in dental health] : Reports no changes in dental health [Travel to Developing Areas] : does not  travel to developing areas [PapSmearDate] : 01/22 [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

## 2022-10-26 NOTE — HISTORY OF PRESENT ILLNESS
[FreeTextEntry1] : 37 year old female who presents today for a complete physical exam.\par c/o IBS symptoms, such as diarrhea, indigestion; states lactose intolerant, tries to avoid, drinks almond milk, though has dressing on caesar salad\par

## 2022-10-28 LAB
25(OH)D3 SERPL-MCNC: 12.8 NG/ML
ALBUMIN SERPL ELPH-MCNC: 4.5 G/DL
ALP BLD-CCNC: 57 U/L
ALT SERPL-CCNC: 15 U/L
ANION GAP SERPL CALC-SCNC: 11 MMOL/L
APPEARANCE: CLEAR
AST SERPL-CCNC: 19 U/L
BASOPHILS # BLD AUTO: 0.06 K/UL
BASOPHILS NFR BLD AUTO: 0.9 %
BILIRUB SERPL-MCNC: 0.2 MG/DL
BILIRUBIN URINE: NEGATIVE
BLOOD URINE: NEGATIVE
BUN SERPL-MCNC: 18 MG/DL
C TRACH RRNA SPEC QL NAA+PROBE: NOT DETECTED
CALCIUM SERPL-MCNC: 9 MG/DL
CHLORIDE SERPL-SCNC: 104 MMOL/L
CHOLEST SERPL-MCNC: 189 MG/DL
CO2 SERPL-SCNC: 23 MMOL/L
COLOR: NORMAL
CREAT SERPL-MCNC: 0.71 MG/DL
EGFR: 114 ML/MIN/1.73M2
EOSINOPHIL # BLD AUTO: 0.13 K/UL
EOSINOPHIL NFR BLD AUTO: 2.1 %
GLUCOSE QUALITATIVE U: NEGATIVE
GLUCOSE SERPL-MCNC: 87 MG/DL
H PYLORI AB SER-ACNC: 139.6 UNITS
H PYLORI IGA SER-ACNC: 29.3 UNITS
HBV SURFACE AG SER QL: NONREACTIVE
HCT VFR BLD CALC: 41 %
HCV AB SER QL: NONREACTIVE
HCV S/CO RATIO: 0.17 S/CO
HDLC SERPL-MCNC: 85 MG/DL
HGB BLD-MCNC: 13.7 G/DL
HIV1+2 AB SPEC QL IA.RAPID: NONREACTIVE
HSV 1+2 IGG SER IA-IMP: NEGATIVE
HSV 1+2 IGG SER IA-IMP: POSITIVE
HSV1 IGG SER QL: 39.5 INDEX
HSV2 IGG SER QL: 0.18 INDEX
IMM GRANULOCYTES NFR BLD AUTO: 0.2 %
KETONES URINE: NEGATIVE
LDLC SERPL CALC-MCNC: 97 MG/DL
LEUKOCYTE ESTERASE URINE: NEGATIVE
LYMPHOCYTES # BLD AUTO: 1.4 K/UL
LYMPHOCYTES NFR BLD AUTO: 22.2 %
MAN DIFF?: NORMAL
MCHC RBC-ENTMCNC: 32.5 PG
MCHC RBC-ENTMCNC: 33.4 GM/DL
MCV RBC AUTO: 97.2 FL
MONOCYTES # BLD AUTO: 0.61 K/UL
MONOCYTES NFR BLD AUTO: 9.7 %
N GONORRHOEA RRNA SPEC QL NAA+PROBE: NOT DETECTED
NEUTROPHILS # BLD AUTO: 4.11 K/UL
NEUTROPHILS NFR BLD AUTO: 64.9 %
NITRITE URINE: NEGATIVE
NONHDLC SERPL-MCNC: 105 MG/DL
PH URINE: 6
PLATELET # BLD AUTO: 226 K/UL
POTASSIUM SERPL-SCNC: 4.6 MMOL/L
PROT SERPL-MCNC: 6.7 G/DL
PROTEIN URINE: NEGATIVE
RBC # BLD: 4.22 M/UL
RBC # FLD: 13 %
SODIUM SERPL-SCNC: 139 MMOL/L
SOURCE AMPLIFICATION: NORMAL
SPECIFIC GRAVITY URINE: 1.02
T PALLIDUM AB SER QL IA: NEGATIVE
TRIGL SERPL-MCNC: 38 MG/DL
TSH SERPL-ACNC: 1.52 UIU/ML
UROBILINOGEN URINE: NORMAL
WBC # FLD AUTO: 6.32 K/UL

## 2022-12-30 ENCOUNTER — APPOINTMENT (OUTPATIENT)
Dept: FAMILY MEDICINE | Facility: CLINIC | Age: 37
End: 2022-12-30
Payer: COMMERCIAL

## 2022-12-30 DIAGNOSIS — K64.9 UNSPECIFIED HEMORRHOIDS: ICD-10-CM

## 2022-12-30 DIAGNOSIS — K59.00 CONSTIPATION, UNSPECIFIED: ICD-10-CM

## 2022-12-30 DIAGNOSIS — R76.8 OTHER SPECIFIED ABNORMAL IMMUNOLOGICAL FINDINGS IN SERUM: ICD-10-CM

## 2022-12-30 PROCEDURE — 99213 OFFICE O/P EST LOW 20 MIN: CPT | Mod: 95

## 2022-12-30 RX ORDER — CLARITHROMYCIN 500 MG/1
500 TABLET, FILM COATED ORAL
Qty: 20 | Refills: 0 | Status: DISCONTINUED | COMMUNITY
Start: 2022-10-28 | End: 2022-12-30

## 2022-12-30 RX ORDER — OMEPRAZOLE 40 MG/1
40 CAPSULE, DELAYED RELEASE ORAL
Qty: 30 | Refills: 0 | Status: DISCONTINUED | COMMUNITY
Start: 2022-10-28 | End: 2022-12-30

## 2022-12-30 RX ORDER — AMOXICILLIN 500 MG/1
500 CAPSULE ORAL TWICE DAILY
Qty: 40 | Refills: 0 | Status: DISCONTINUED | COMMUNITY
Start: 2022-10-28 | End: 2022-12-30

## 2022-12-30 NOTE — HISTORY OF PRESENT ILLNESS
[Home] : at home, [unfilled] , at the time of the visit. [Medical Office: ([facility])___] : at the medical office located in  [Verbal consent obtained from patient] : the patient, [unfilled] [FreeTextEntry1] : c/o bleeding internal hemorrhoid, has GI appt in jan, worried about anemia and drop in O2 as she has a hx of passing out and receiving 5u prbcs due to hemorrhaging hemorrhoid several yrs ago, denies constipation, admits to possible straining\par also due for vit d and h. pylori rechk, completed prevpak

## 2022-12-31 LAB
25(OH)D3 SERPL-MCNC: 21.3 NG/ML
BASOPHILS # BLD AUTO: 0.04 K/UL
BASOPHILS NFR BLD AUTO: 0.7 %
EOSINOPHIL # BLD AUTO: 0.11 K/UL
EOSINOPHIL NFR BLD AUTO: 2 %
FERRITIN SERPL-MCNC: 39 NG/ML
FOLATE SERPL-MCNC: 15.1 NG/ML
HCT VFR BLD CALC: 40.6 %
HGB BLD-MCNC: 13.4 G/DL
IMM GRANULOCYTES NFR BLD AUTO: 0.2 %
IRON SATN MFR SERPL: 24 %
IRON SERPL-MCNC: 80 UG/DL
LYMPHOCYTES # BLD AUTO: 1.15 K/UL
LYMPHOCYTES NFR BLD AUTO: 21.4 %
MAN DIFF?: NORMAL
MCHC RBC-ENTMCNC: 32.1 PG
MCHC RBC-ENTMCNC: 33 GM/DL
MCV RBC AUTO: 97.4 FL
MONOCYTES # BLD AUTO: 0.6 K/UL
MONOCYTES NFR BLD AUTO: 11.2 %
NEUTROPHILS # BLD AUTO: 3.46 K/UL
NEUTROPHILS NFR BLD AUTO: 64.5 %
PLATELET # BLD AUTO: 206 K/UL
RBC # BLD: 4.17 M/UL
RBC # FLD: 12.9 %
TIBC SERPL-MCNC: 336 UG/DL
UIBC SERPL-MCNC: 256 UG/DL
VIT B12 SERPL-MCNC: 377 PG/ML
WBC # FLD AUTO: 5.37 K/UL

## 2023-01-13 ENCOUNTER — APPOINTMENT (OUTPATIENT)
Dept: DERMATOLOGY | Facility: CLINIC | Age: 38
End: 2023-01-13
Payer: COMMERCIAL

## 2023-01-13 PROCEDURE — 17110 DESTRUCTION B9 LES UP TO 14: CPT

## 2023-01-13 PROCEDURE — 99212 OFFICE O/P EST SF 10 MIN: CPT | Mod: 25

## 2023-02-02 ENCOUNTER — APPOINTMENT (OUTPATIENT)
Dept: DERMATOLOGY | Facility: CLINIC | Age: 38
End: 2023-02-02

## 2023-02-15 ENCOUNTER — APPOINTMENT (OUTPATIENT)
Dept: FAMILY MEDICINE | Facility: CLINIC | Age: 38
End: 2023-02-15

## 2023-02-16 ENCOUNTER — APPOINTMENT (OUTPATIENT)
Dept: DERMATOLOGY | Facility: CLINIC | Age: 38
End: 2023-02-16

## 2023-04-13 ENCOUNTER — APPOINTMENT (OUTPATIENT)
Dept: INTERNAL MEDICINE | Facility: CLINIC | Age: 38
End: 2023-04-13
Payer: COMMERCIAL

## 2023-04-13 VITALS
HEIGHT: 60 IN | SYSTOLIC BLOOD PRESSURE: 99 MMHG | WEIGHT: 137 LBS | BODY MASS INDEX: 26.9 KG/M2 | OXYGEN SATURATION: 97 % | DIASTOLIC BLOOD PRESSURE: 52 MMHG | HEART RATE: 75 BPM | TEMPERATURE: 99.2 F

## 2023-04-13 DIAGNOSIS — E53.8 DEFICIENCY OF OTHER SPECIFIED B GROUP VITAMINS: ICD-10-CM

## 2023-04-13 DIAGNOSIS — Z13.1 ENCOUNTER FOR SCREENING FOR DIABETES MELLITUS: ICD-10-CM

## 2023-04-13 DIAGNOSIS — Z00.00 ENCOUNTER FOR GENERAL ADULT MEDICAL EXAMINATION W/OUT ABNORMAL FINDINGS: ICD-10-CM

## 2023-04-13 DIAGNOSIS — J45.909 UNSPECIFIED ASTHMA, UNCOMPLICATED: ICD-10-CM

## 2023-04-13 DIAGNOSIS — Z13.31 ENCOUNTER FOR SCREENING FOR DEPRESSION: ICD-10-CM

## 2023-04-13 DIAGNOSIS — D64.9 ANEMIA, UNSPECIFIED: ICD-10-CM

## 2023-04-13 PROCEDURE — 99214 OFFICE O/P EST MOD 30 MIN: CPT

## 2023-04-13 NOTE — ASSESSMENT
[FreeTextEntry1] : #Anemia f/u cbc, iron and ferritin #Family hx of prediabetes - f/u a1c #Vitamin D  discussed continue drisdol for only 8 weeks then switch to vit d 2000 units qd for 3 months. #[facility] - rx epi pen #asthma controlled refill asthma inhaler  pt agrees and understands plan via teach back method. all questions answered.  CPE in oct 2023

## 2023-04-13 NOTE — HISTORY OF PRESENT ILLNESS
[FreeTextEntry8] : 38 F here to establish care.\par LMP: 4/2023\par \par PMHX:\par Vitamin D deficiency\par Hx of H.Pylori - treated for 2 weeks\par \par Allergies: Shellfish - anaphylaxis\par \par Medications:\par Drisdol for 8 weeks\par \par Surgical Hx:\par Hemorrhoidectomy\par \par Family Hx\par Mother: HTN. Prediabetes, HL\par Father: unknown\par Maternal Grandmother: TypeIIDM\par Maternal Grandfather: Unknown\par Paternal Grandmother: Unknown\par Paternal Grandfather:Unknown\par Siblings - 1 brother healthy \par \par Social Hx\par ETOH  -  socially\par Tobacco - denies\par Illict Drug use - denies\par \par Occupation: Medical Billing \par

## 2023-04-13 NOTE — HEALTH RISK ASSESSMENT
[No] : No [No falls in past year] : Patient reported no falls in the past year [0] : 2) Feeling down, depressed, or hopeless: Not at all (0) [Never] : Never [[id number]] : 0

## 2023-04-15 ENCOUNTER — NON-APPOINTMENT (OUTPATIENT)
Age: 38
End: 2023-04-15

## 2023-04-15 LAB
25(OH)D3 SERPL-MCNC: 18.5 NG/ML
ALBUMIN SERPL ELPH-MCNC: 4.6 G/DL
ALP BLD-CCNC: 53 U/L
ALT SERPL-CCNC: 17 U/L
ANION GAP SERPL CALC-SCNC: 13 MMOL/L
AST SERPL-CCNC: 24 U/L
BASOPHILS # BLD AUTO: 0.03 K/UL
BASOPHILS NFR BLD AUTO: 0.5 %
BILIRUB SERPL-MCNC: 0.8 MG/DL
BUN SERPL-MCNC: 14 MG/DL
CALCIUM SERPL-MCNC: 9.3 MG/DL
CHLORIDE SERPL-SCNC: 103 MMOL/L
CHOLEST SERPL-MCNC: 172 MG/DL
CO2 SERPL-SCNC: 22 MMOL/L
CREAT SERPL-MCNC: 0.55 MG/DL
EGFR: 122 ML/MIN/1.73M2
EOSINOPHIL # BLD AUTO: 0.05 K/UL
EOSINOPHIL NFR BLD AUTO: 0.8 %
ESTIMATED AVERAGE GLUCOSE: 97 MG/DL
FERRITIN SERPL-MCNC: 35 NG/ML
FOLATE SERPL-MCNC: 16.5 NG/ML
GLUCOSE SERPL-MCNC: 80 MG/DL
HBA1C MFR BLD HPLC: 5 %
HBV SURFACE AB SERPL IA-ACNC: 38.8 MIU/ML
HCT VFR BLD CALC: 40.9 %
HCT VFR BLD CALC: 41.9 %
HCV AB SER QL: NONREACTIVE
HCV S/CO RATIO: 0.14 S/CO
HDLC SERPL-MCNC: 77 MG/DL
HGB BLD-MCNC: 13.2 G/DL
HGB BLD-MCNC: 13.4 G/DL
IMM GRANULOCYTES NFR BLD AUTO: 0.2 %
IRON SATN MFR SERPL: 46 %
IRON SERPL-MCNC: 141 UG/DL
LDLC SERPL CALC-MCNC: 86 MG/DL
LYMPHOCYTES # BLD AUTO: 1.16 K/UL
LYMPHOCYTES NFR BLD AUTO: 19.3 %
MAN DIFF?: NORMAL
MCHC RBC-ENTMCNC: 31.5 PG
MCHC RBC-ENTMCNC: 31.7 PG
MCHC RBC-ENTMCNC: 32 GM/DL
MCHC RBC-ENTMCNC: 32.3 GM/DL
MCV RBC AUTO: 97.6 FL
MCV RBC AUTO: 99.1 FL
MONOCYTES # BLD AUTO: 0.51 K/UL
MONOCYTES NFR BLD AUTO: 8.5 %
NEUTROPHILS # BLD AUTO: 4.26 K/UL
NEUTROPHILS NFR BLD AUTO: 70.7 %
NONHDLC SERPL-MCNC: 95 MG/DL
PLATELET # BLD AUTO: 212 K/UL
PLATELET # BLD AUTO: 235 K/UL
POTASSIUM SERPL-SCNC: 4.5 MMOL/L
PROT SERPL-MCNC: 6.3 G/DL
RBC # BLD: 4.19 M/UL
RBC # BLD: 4.23 M/UL
RBC # FLD: 13.1 %
RBC # FLD: 13.3 %
SODIUM SERPL-SCNC: 138 MMOL/L
TIBC SERPL-MCNC: 307 UG/DL
TRIGL SERPL-MCNC: 49 MG/DL
TSH SERPL-ACNC: 1.53 UIU/ML
UIBC SERPL-MCNC: 166 UG/DL
VIT B12 SERPL-MCNC: 343 PG/ML
WBC # FLD AUTO: 6.02 K/UL
WBC # FLD AUTO: 6.09 K/UL

## 2023-04-17 ENCOUNTER — APPOINTMENT (OUTPATIENT)
Dept: DERMATOLOGY | Facility: CLINIC | Age: 38
End: 2023-04-17
Payer: COMMERCIAL

## 2023-04-17 PROCEDURE — 99213 OFFICE O/P EST LOW 20 MIN: CPT | Mod: GC

## 2023-05-19 ENCOUNTER — APPOINTMENT (OUTPATIENT)
Dept: MAMMOGRAPHY | Facility: CLINIC | Age: 38
End: 2023-05-19

## 2023-05-25 ENCOUNTER — APPOINTMENT (OUTPATIENT)
Dept: DERMATOLOGY | Facility: CLINIC | Age: 38
End: 2023-05-25
Payer: COMMERCIAL

## 2023-05-25 DIAGNOSIS — L65.9 NONSCARRING HAIR LOSS, UNSPECIFIED: ICD-10-CM

## 2023-05-25 PROCEDURE — 17110 DESTRUCTION B9 LES UP TO 14: CPT

## 2023-05-25 PROCEDURE — 99212 OFFICE O/P EST SF 10 MIN: CPT | Mod: 25

## 2023-08-17 ENCOUNTER — APPOINTMENT (OUTPATIENT)
Dept: DERMATOLOGY | Facility: CLINIC | Age: 38
End: 2023-08-17
Payer: COMMERCIAL

## 2023-08-17 PROCEDURE — 99213 OFFICE O/P EST LOW 20 MIN: CPT

## 2023-08-18 ENCOUNTER — APPOINTMENT (OUTPATIENT)
Dept: DERMATOLOGY | Facility: CLINIC | Age: 38
End: 2023-08-18

## 2023-08-22 NOTE — PHYSICAL EXAM
[Alert] : alert [Well Nourished] : well nourished [FreeTextEntry3] : verrucous papule on medial anterior L plantar foot

## 2023-08-22 NOTE — ASSESSMENT
[FreeTextEntry1] : 1) VV, L plantar foot - LN2 performed today (treatment #4) --Cryotherapy performed:     Risks: erythema, blistering, dyspigmentation (hypo/hyper), scar, need for multiple     treatment, persistence/recurrence.     Lesion number: 1     #freeze-thaw cycles to each lesion: 3     Thaw time: 5s     Wound care discussed - In 3-4 days, start OTC salicylic acid nightly and cover lesion with band-aid/tape. Can pare down with clean disposable nail fails. Once gone, continue to treat for 2 weeks after she doesn't see wart anymore  RTC 1 month or prn

## 2023-08-22 NOTE — HISTORY OF PRESENT ILLNESS
[FreeTextEntry1] : F/u plantar wart [de-identified] : Patient is 38 year F presenting with the following.   # L foot plantar wart - No hx of immunosuppression. Notes her son had a wart - LN at last visit. Using wart stick consistently and it fell off and grew back   Also noting eyelash loss for months. Denies hair loss elsewhere.

## 2023-09-14 ENCOUNTER — APPOINTMENT (OUTPATIENT)
Dept: DERMATOLOGY | Facility: CLINIC | Age: 38
End: 2023-09-14
Payer: COMMERCIAL

## 2023-09-14 PROCEDURE — 99213 OFFICE O/P EST LOW 20 MIN: CPT

## 2023-10-01 PROBLEM — L81.2 EPHELIDES: Status: ACTIVE | Noted: 2018-08-08

## 2023-10-12 ENCOUNTER — APPOINTMENT (OUTPATIENT)
Dept: DERMATOLOGY | Facility: CLINIC | Age: 38
End: 2023-10-12
Payer: COMMERCIAL

## 2023-10-12 PROCEDURE — 99213 OFFICE O/P EST LOW 20 MIN: CPT | Mod: 25

## 2023-10-12 PROCEDURE — 17110 DESTRUCTION B9 LES UP TO 14: CPT

## 2023-10-26 ENCOUNTER — NON-APPOINTMENT (OUTPATIENT)
Age: 38
End: 2023-10-26

## 2023-10-26 ENCOUNTER — APPOINTMENT (OUTPATIENT)
Dept: INTERNAL MEDICINE | Facility: CLINIC | Age: 38
End: 2023-10-26
Payer: COMMERCIAL

## 2023-10-26 VITALS
DIASTOLIC BLOOD PRESSURE: 68 MMHG | RESPIRATION RATE: 16 BRPM | HEIGHT: 61 IN | BODY MASS INDEX: 26.06 KG/M2 | HEART RATE: 75 BPM | SYSTOLIC BLOOD PRESSURE: 104 MMHG | TEMPERATURE: 98.3 F | OXYGEN SATURATION: 96 % | WEIGHT: 138 LBS

## 2023-10-26 DIAGNOSIS — Z00.00 ENCOUNTER FOR GENERAL ADULT MEDICAL EXAMINATION W/OUT ABNORMAL FINDINGS: ICD-10-CM

## 2023-10-26 DIAGNOSIS — Z13.6 ENCOUNTER FOR SCREENING FOR CARDIOVASCULAR DISORDERS: ICD-10-CM

## 2023-10-26 DIAGNOSIS — Z29.9 ENCOUNTER FOR PROPHYLACTIC MEASURES, UNSPECIFIED: ICD-10-CM

## 2023-10-26 DIAGNOSIS — Z13.31 ENCOUNTER FOR SCREENING FOR DEPRESSION: ICD-10-CM

## 2023-10-26 DIAGNOSIS — E55.9 VITAMIN D DEFICIENCY, UNSPECIFIED: ICD-10-CM

## 2023-10-26 PROCEDURE — 36415 COLL VENOUS BLD VENIPUNCTURE: CPT

## 2023-10-26 PROCEDURE — 99395 PREV VISIT EST AGE 18-39: CPT | Mod: 25

## 2023-10-26 PROCEDURE — 93000 ELECTROCARDIOGRAM COMPLETE: CPT

## 2023-11-08 ENCOUNTER — NON-APPOINTMENT (OUTPATIENT)
Age: 38
End: 2023-11-08

## 2023-11-08 ENCOUNTER — APPOINTMENT (OUTPATIENT)
Dept: CARDIOLOGY | Facility: CLINIC | Age: 38
End: 2023-11-08
Payer: COMMERCIAL

## 2023-11-08 VITALS
OXYGEN SATURATION: 96 % | DIASTOLIC BLOOD PRESSURE: 50 MMHG | WEIGHT: 136 LBS | HEIGHT: 61 IN | BODY MASS INDEX: 25.68 KG/M2 | HEART RATE: 70 BPM | TEMPERATURE: 98.3 F | SYSTOLIC BLOOD PRESSURE: 106 MMHG

## 2023-11-08 DIAGNOSIS — R07.89 OTHER CHEST PAIN: ICD-10-CM

## 2023-11-08 DIAGNOSIS — R94.31 ABNORMAL ELECTROCARDIOGRAM [ECG] [EKG]: ICD-10-CM

## 2023-11-08 PROCEDURE — 93000 ELECTROCARDIOGRAM COMPLETE: CPT

## 2023-11-08 PROCEDURE — 99204 OFFICE O/P NEW MOD 45 MIN: CPT

## 2023-11-27 ENCOUNTER — APPOINTMENT (OUTPATIENT)
Dept: CARDIOLOGY | Facility: CLINIC | Age: 38
End: 2023-11-27

## 2023-11-30 ENCOUNTER — APPOINTMENT (OUTPATIENT)
Dept: DERMATOLOGY | Facility: CLINIC | Age: 38
End: 2023-11-30
Payer: COMMERCIAL

## 2023-11-30 DIAGNOSIS — B07.9 VIRAL WART, UNSPECIFIED: ICD-10-CM

## 2023-11-30 PROCEDURE — 99213 OFFICE O/P EST LOW 20 MIN: CPT

## 2023-12-02 PROBLEM — B07.9 VERRUCA VULGARIS: Status: ACTIVE | Noted: 2023-01-13

## 2023-12-27 ENCOUNTER — APPOINTMENT (OUTPATIENT)
Dept: INTERNAL MEDICINE | Facility: CLINIC | Age: 38
End: 2023-12-27
Payer: COMMERCIAL

## 2023-12-27 VITALS
BODY MASS INDEX: 26.06 KG/M2 | HEART RATE: 79 BPM | WEIGHT: 138 LBS | TEMPERATURE: 98 F | DIASTOLIC BLOOD PRESSURE: 76 MMHG | HEIGHT: 61 IN | OXYGEN SATURATION: 98 % | SYSTOLIC BLOOD PRESSURE: 113 MMHG

## 2023-12-27 DIAGNOSIS — J10.1 INFLUENZA DUE TO OTHER IDENTIFIED INFLUENZA VIRUS WITH OTHER RESPIRATORY MANIFESTATIONS: ICD-10-CM

## 2023-12-27 DIAGNOSIS — J02.8 ACUTE PHARYNGITIS DUE TO OTHER SPECIFIED ORGANISMS: ICD-10-CM

## 2023-12-27 DIAGNOSIS — B97.89 ACUTE PHARYNGITIS DUE TO OTHER SPECIFIED ORGANISMS: ICD-10-CM

## 2023-12-27 DIAGNOSIS — R68.89 OTHER GENERAL SYMPTOMS AND SIGNS: ICD-10-CM

## 2023-12-27 LAB
FLUAV SPEC QL CULT: NORMAL
FLUBV AG SPEC QL IA: NORMAL
S PYO AG SPEC QL IA: NORMAL

## 2023-12-27 PROCEDURE — 99214 OFFICE O/P EST MOD 30 MIN: CPT | Mod: 25

## 2023-12-27 PROCEDURE — 87804 INFLUENZA ASSAY W/OPTIC: CPT | Mod: QW

## 2023-12-27 PROCEDURE — 87880 STREP A ASSAY W/OPTIC: CPT | Mod: QW

## 2023-12-27 NOTE — ASSESSMENT
[FreeTextEntry1] : #Influenza B LMP: recently finished - not trying to get pregnant. POCT strep neg POCT flu - + influenza type B f/u covid pcr rsv swab tamiflu 75mg bid for 5 days discussed with pt side effects metallic taste, n/v, headache w/ tamiflu - experience any side effects contact md and stop medicaton Tylenol or Ibuprofen is recommended for treatment of fevers, chills, body-aches Over the counter decongestants and cough suppressants for mild nasal congestion, rhinorrhea and cough Lozenges for sore throat Keep well hydrated Rest continue to wear mask if go in public and around family members rto if no improvement pt agrees and understands plan via teach back method. all questions answered. A total of 30 minutes including same day pre-visit chart review, face to face time with patient, coordination of care, and documentation was spent on this visit.

## 2023-12-27 NOTE — HEALTH RISK ASSESSMENT
[No] : No [No falls in past year] : Patient reported no falls in the past year [0] : 2) Feeling down, depressed, or hopeless: Not at all (0) [PHQ-2 Negative - No further assessment needed] : PHQ-2 Negative - No further assessment needed [Never] : Never [[id number]] : 0

## 2023-12-27 NOTE — PHYSICAL EXAM
[No Acute Distress] : no acute distress [Well Nourished] : well nourished [Well Developed] : well developed [Well-Appearing] : well-appearing [Normal Sclera/Conjunctiva] : normal sclera/conjunctiva [PERRL] : pupils equal round and reactive to light [EOMI] : extraocular movements intact [Normal Outer Ear/Nose] : the outer ears and nose were normal in appearance [Normal Oropharynx] : the oropharynx was normal [No JVD] : no jugular venous distention [No Lymphadenopathy] : no lymphadenopathy [Supple] : supple [No Respiratory Distress] : no respiratory distress  [No Accessory Muscle Use] : no accessory muscle use [Clear to Auscultation] : lungs were clear to auscultation bilaterally [Normal Rate] : normal rate  [Regular Rhythm] : with a regular rhythm [Normal S1, S2] : normal S1 and S2 [No Edema] : there was no peripheral edema [Soft] : abdomen soft [Non Tender] : non-tender [Non-distended] : non-distended [Normal Bowel Sounds] : normal bowel sounds [Normal Posterior Cervical Nodes] : no posterior cervical lymphadenopathy [Normal Anterior Cervical Nodes] : no anterior cervical lymphadenopathy [No CVA Tenderness] : no CVA  tenderness [No Spinal Tenderness] : no spinal tenderness [No Joint Swelling] : no joint swelling [Grossly Normal Strength/Tone] : grossly normal strength/tone [No Rash] : no rash [Coordination Grossly Intact] : coordination grossly intact [No Focal Deficits] : no focal deficits [Normal Gait] : normal gait [Deep Tendon Reflexes (DTR)] : deep tendon reflexes were 2+ and symmetric [Normal Affect] : the affect was normal [Normal Insight/Judgement] : insight and judgment were intact [de-identified] : no erythema, no tonsillar enlargement bilaterally, no exudate bilaterally, uvula midline. +PND

## 2023-12-27 NOTE — HISTORY OF PRESENT ILLNESS
[FreeTextEntry8] : 38 F LMP: just finished period - not sexually active. not trying to get pregnant  presents with 2 days of flu like sx Pt family + flu +  Rhinitis, Dry Cough + Sinus congestion, No  Chest Congestion + Sore Throat Denies fever,  + body aches  L earache Denies dizziness, tinnitus or hearing loss. Denies SOB or wheezing or CP or palpitations Denies NVD, abdominal pain Denies HA + covid vaccinated negative covid test denies any recent travel +  sick contacts, exposure to flu  denies any loss of taste or smell. pt able to tolerate po take otc nyquil and extra strength tylenol - mild improvement

## 2023-12-27 NOTE — REVIEW OF SYSTEMS
[Chills] : chills [Fatigue] : fatigue [Earache] : earache [Hoarseness] : hoarseness [Nasal Discharge] : nasal discharge [Sore Throat] : sore throat [Postnasal Drip] : postnasal drip [Cough] : cough [Negative] : Psychiatric [Fever] : no fever [Hot Flashes] : no hot flashes [Night Sweats] : no night sweats [Hearing Loss] : no hearing loss [Nosebleed] : no nosebleeds [Chest Pain] : no chest pain [Palpitations] : no palpitations [Leg Claudication] : no leg claudication [Lower Ext Edema] : no lower extremity edema [Orthopnea] : no orthopnea [Paroxysmal Nocturnal Dyspnea] : no paroxysmal nocturnal dyspnea [Shortness Of Breath] : no shortness of breath [Wheezing] : no wheezing [Dyspnea on Exertion] : no dyspnea on exertion [FreeTextEntry6] : dry non productive cough

## 2024-01-02 ENCOUNTER — NON-APPOINTMENT (OUTPATIENT)
Age: 39
End: 2024-01-02

## 2024-01-02 LAB
BACTERIA THROAT CULT: NORMAL
INFLUENZA A RESULT: NOT DETECTED
INFLUENZA B RESULT: DETECTED
RESP SYN VIRUS RESULT: NOT DETECTED
SARS-COV-2 RESULT: NOT DETECTED

## 2024-01-04 ENCOUNTER — APPOINTMENT (OUTPATIENT)
Dept: DERMATOLOGY | Facility: CLINIC | Age: 39
End: 2024-01-04

## 2024-01-14 ENCOUNTER — NON-APPOINTMENT (OUTPATIENT)
Age: 39
End: 2024-01-14

## 2024-02-05 ENCOUNTER — APPOINTMENT (OUTPATIENT)
Dept: ORTHOPEDIC SURGERY | Facility: CLINIC | Age: 39
End: 2024-02-05
Payer: COMMERCIAL

## 2024-02-05 VITALS — HEIGHT: 61 IN | WEIGHT: 130 LBS | BODY MASS INDEX: 24.55 KG/M2

## 2024-02-05 DIAGNOSIS — Z78.9 OTHER SPECIFIED HEALTH STATUS: ICD-10-CM

## 2024-02-05 DIAGNOSIS — M25.569 PAIN IN UNSPECIFIED KNEE: ICD-10-CM

## 2024-02-05 PROCEDURE — 99213 OFFICE O/P EST LOW 20 MIN: CPT

## 2024-02-05 PROCEDURE — 73564 X-RAY EXAM KNEE 4 OR MORE: CPT | Mod: RT

## 2024-02-05 RX ORDER — OSELTAMIVIR PHOSPHATE 75 MG/1
75 CAPSULE ORAL TWICE DAILY
Qty: 1 | Refills: 0 | Status: DISCONTINUED | COMMUNITY
Start: 2023-12-27 | End: 2024-02-05

## 2024-02-05 RX ORDER — ALBUTEROL SULFATE 90 UG/1
108 (90 BASE) INHALANT RESPIRATORY (INHALATION)
Qty: 1 | Refills: 1 | Status: DISCONTINUED | COMMUNITY
Start: 2021-10-20 | End: 2024-02-05

## 2024-02-05 RX ORDER — EPINEPHRINE 0.3 MG/.3ML
0.3 INJECTION INTRAMUSCULAR
Qty: 1 | Refills: 5 | Status: DISCONTINUED | COMMUNITY
Start: 2021-12-28 | End: 2024-02-05

## 2024-02-05 RX ORDER — ERGOCALCIFEROL 1.25 MG/1
1.25 MG CAPSULE ORAL WEEKLY
Qty: 8 | Refills: 0 | Status: DISCONTINUED | COMMUNITY
Start: 2022-10-28 | End: 2024-02-05

## 2024-02-05 NOTE — HISTORY OF PRESENT ILLNESS
[de-identified] : 39 year old female iwth pain in the right knee, symptoms started two weeks ago after exercising at the gym, doing lifting. Pain is in the front of the knee and worse with stairs, kneeling, squatting, walking. Occasional clicking/buckling. She has been taking Advil and remains symptomatic.  [FreeTextEntry5] : Patient is here for right knee pain that began 2 weeks ago, not due to injury. Noticed increased pain post lifting. Has continued exercising with discomfort. No swelling. Pain is anterior. Clicking/buckling with onset pain, has improved since. Worsens with prolonged standing/walking. No prior injury. No formal treatment to date. Tried Advil.

## 2024-02-05 NOTE — PHYSICAL EXAM
[5___] : hamstring 5[unfilled]/5 [Right] : right knee [All Views] : anteroposterior, lateral, skyline, and anteroposterior standing [There are no fractures, subluxations or dislocations. No significant abnormalities are seen] : There are no fractures, subluxations or dislocations. No significant abnormalities are seen [] : no calf tenderness [TWNoteComboBox7] : flexion 120 degrees [de-identified] : extension 0 degrees

## 2024-02-05 NOTE — DISCUSSION/SUMMARY
[de-identified] : MRI right knee eval for MMT, PF chondral injury  modify activities use elastic sleeve try OTC meds ice as needed try topical lidocaine reviewed current medications used by this patient  poss csi shec declined and mri if no resolution

## 2024-02-26 ENCOUNTER — APPOINTMENT (OUTPATIENT)
Dept: ORTHOPEDIC SURGERY | Facility: CLINIC | Age: 39
End: 2024-02-26
Payer: COMMERCIAL

## 2024-02-26 DIAGNOSIS — M70.51 OTHER BURSITIS OF KNEE, RIGHT KNEE: ICD-10-CM

## 2024-02-26 PROCEDURE — 99214 OFFICE O/P EST MOD 30 MIN: CPT

## 2024-02-26 RX ORDER — MELOXICAM 15 MG/1
15 TABLET ORAL
Qty: 30 | Refills: 0 | Status: ACTIVE | COMMUNITY
Start: 2024-02-26 | End: 1900-01-01

## 2024-02-26 NOTE — DISCUSSION/SUMMARY
[de-identified] :  RE:  ALIS SEGUNDO   Acct #- [id number]   Attention:  Nurse Reviewer /Medical Director    Based on my patient's condition, I strongly believe that the MRI is medically.necessary.   The patient has failed oral meds, injections and PT and conservative treatment in combination or by themselves and therefore needs the MRI.   The MRI will dictate further treatment t recommendations. MRI right knee eval for MMT, PF chondral injury  modify activities use elastic sleeve try OTC meds ice as needed try topical lidocaine reviewed current medications used by this patient  poss csi shec declined and mri if no resolution

## 2024-02-26 NOTE — HISTORY OF PRESENT ILLNESS
[Right Leg] : right leg [7] : 7 [Gradual] : gradual [6] : 6 [Stabbing] : stabbing [Rest] : rest [Exercising] : exercising [Intermittent] : intermittent [Full time] : Work status: full time [de-identified] : pain in the right knee, symptoms for several  weeks ago after exercising at the gym, doing lifting. Pain is in the medial aspect of knee. She has been taking Advil and remains symptomatic. Has buckling [] : This patient has had an injection before: no [FreeTextEntry1] : right knee  [FreeTextEntry5] : Patient is here for right knee pain that began 2 weeks ago, not due to injury. Noticed increased pain post lifting. Has continued exercising with discomfort. No swelling. Pain is anterior. Clicking/buckling with onset pain, has improved since. Worsens with prolonged standing/walking. No prior injury. No formal treatment to date. Tried Advil.  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

## 2024-02-26 NOTE — PHYSICAL EXAM
[Right] : right knee [5___] : quadriceps 5[unfilled]/5 [Positive] : positive Shaylee [] : patient ambulates without assistive device [TWNoteComboBox7] : flexion 120 degrees [de-identified] : extension 0 degrees

## 2024-02-29 ENCOUNTER — APPOINTMENT (OUTPATIENT)
Dept: MRI IMAGING | Facility: CLINIC | Age: 39
End: 2024-02-29
Payer: COMMERCIAL

## 2024-02-29 PROCEDURE — 73721 MRI JNT OF LWR EXTRE W/O DYE: CPT | Mod: RT

## 2024-03-04 ENCOUNTER — APPOINTMENT (OUTPATIENT)
Dept: ORTHOPEDIC SURGERY | Facility: CLINIC | Age: 39
End: 2024-03-04
Payer: COMMERCIAL

## 2024-03-04 DIAGNOSIS — M70.51 OTHER BURSITIS OF KNEE, RIGHT KNEE: ICD-10-CM

## 2024-03-04 PROCEDURE — 99213 OFFICE O/P EST LOW 20 MIN: CPT

## 2024-03-04 NOTE — DATA REVIEWED
[MRI] : MRI [Knee] : knee [Right] : of the right [Report was reviewed and noted in the chart] : The report was reviewed and noted in the chart [I reviewed the films/CD and agree] : I reviewed the films/CD and agree [FreeTextEntry1] : PF synovitis/effusion, no meniscal or ligamentous injury.

## 2024-03-04 NOTE — PHYSICAL EXAM
[Right] : right knee [Positive] : positive Shaylee [5___] : hamstring 5[unfilled]/5 [] : no effusion [de-identified] : extension 0 degrees [TWNoteComboBox7] : flexion 120 degrees

## 2024-03-04 NOTE — HISTORY OF PRESENT ILLNESS
[Right Leg] : right leg [Gradual] : gradual [7] : 7 [6] : 6 [Stabbing] : stabbing [Rest] : rest [Intermittent] : intermittent [Exercising] : exercising [Full time] : Work status: full time [de-identified] : pain in the right knee,for several  weeks ago after exercising at the gym, doing lifting. Pain is in the medial aspect of knee. She has been taking Advil and remains symptomatic. Has buckling. Returns to review recent MRI evaluation.  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

## 2024-03-04 NOTE — DISCUSSION/SUMMARY
[de-identified] : modify activities use elastic sleeve try OTC meds ice as needed try topical lidocaine reviewed current medications used by this patient

## 2024-03-09 ENCOUNTER — OUTPATIENT (OUTPATIENT)
Dept: OUTPATIENT SERVICES | Facility: HOSPITAL | Age: 39
LOS: 1 days | End: 2024-03-09
Payer: COMMERCIAL

## 2024-03-09 ENCOUNTER — APPOINTMENT (OUTPATIENT)
Dept: MAMMOGRAPHY | Facility: CLINIC | Age: 39
End: 2024-03-09
Payer: COMMERCIAL

## 2024-03-09 DIAGNOSIS — Z00.8 ENCOUNTER FOR OTHER GENERAL EXAMINATION: ICD-10-CM

## 2024-03-09 PROCEDURE — 77063 BREAST TOMOSYNTHESIS BI: CPT

## 2024-03-09 PROCEDURE — 77067 SCR MAMMO BI INCL CAD: CPT

## 2024-03-09 PROCEDURE — 77063 BREAST TOMOSYNTHESIS BI: CPT | Mod: 26

## 2024-03-09 PROCEDURE — 77067 SCR MAMMO BI INCL CAD: CPT | Mod: 26

## 2024-03-21 ENCOUNTER — NON-APPOINTMENT (OUTPATIENT)
Age: 39
End: 2024-03-21

## 2024-03-28 NOTE — REASON FOR VISIT
Labs drawn with ultrasound guidance.   [Consultation] : a consultation visit [FreeTextEntry1] : Patient presents to the office today for Right earlobe repair. Patient states while in high school, she fell asleep wearing her earing, and woke up with tear in ear. Patient states she is interested in having ear lobe repaired. Patient denies additional concerns.

## 2024-05-23 ENCOUNTER — APPOINTMENT (OUTPATIENT)
Dept: INTERNAL MEDICINE | Facility: CLINIC | Age: 39
End: 2024-05-23

## 2024-05-23 NOTE — HISTORY OF PRESENT ILLNESS
[FreeTextEntry8] : 39 F LMP Patient c/o of pain x for x days pt reports pain is x/10 radiates to aggravated by alleviated by no trauma or fall or injury associated with takes otc for pain with relief ice/heat for pain Pt has no further complaints. Pt reports no numbness, tingling, weakness or loss of sensation.

## 2024-07-08 ENCOUNTER — APPOINTMENT (OUTPATIENT)
Dept: INTERNAL MEDICINE | Facility: CLINIC | Age: 39
End: 2024-07-08

## 2024-10-28 ENCOUNTER — APPOINTMENT (OUTPATIENT)
Dept: INTERNAL MEDICINE | Facility: CLINIC | Age: 39
End: 2024-10-28

## 2024-10-28 ENCOUNTER — NON-APPOINTMENT (OUTPATIENT)
Age: 39
End: 2024-10-28

## 2024-10-28 VITALS
WEIGHT: 137 LBS | BODY MASS INDEX: 25.86 KG/M2 | OXYGEN SATURATION: 96 % | HEIGHT: 61 IN | TEMPERATURE: 97.4 F | DIASTOLIC BLOOD PRESSURE: 72 MMHG | SYSTOLIC BLOOD PRESSURE: 124 MMHG | HEART RATE: 70 BPM

## 2024-10-28 DIAGNOSIS — Z29.9 ENCOUNTER FOR PROPHYLACTIC MEASURES, UNSPECIFIED: ICD-10-CM

## 2024-10-28 DIAGNOSIS — Z00.00 ENCOUNTER FOR GENERAL ADULT MEDICAL EXAMINATION W/OUT ABNORMAL FINDINGS: ICD-10-CM

## 2024-10-28 DIAGNOSIS — Z13.31 ENCOUNTER FOR SCREENING FOR DEPRESSION: ICD-10-CM

## 2024-10-28 DIAGNOSIS — Z13.6 ENCOUNTER FOR SCREENING FOR CARDIOVASCULAR DISORDERS: ICD-10-CM

## 2024-10-28 DIAGNOSIS — J30.9 ALLERGIC RHINITIS, UNSPECIFIED: ICD-10-CM

## 2024-10-28 PROCEDURE — 36415 COLL VENOUS BLD VENIPUNCTURE: CPT

## 2024-10-28 PROCEDURE — 90656 IIV3 VACC NO PRSV 0.5 ML IM: CPT

## 2024-10-28 PROCEDURE — G0444 DEPRESSION SCREEN ANNUAL: CPT | Mod: 59

## 2024-10-28 PROCEDURE — G0008: CPT

## 2024-10-28 PROCEDURE — 93000 ELECTROCARDIOGRAM COMPLETE: CPT | Mod: 59

## 2024-10-28 PROCEDURE — 99395 PREV VISIT EST AGE 18-39: CPT | Mod: 25

## 2024-10-28 RX ORDER — CETIRIZINE HYDROCHLORIDE 10 MG/1
10 TABLET, COATED ORAL DAILY
Qty: 90 | Refills: 3 | Status: ACTIVE | COMMUNITY
Start: 2024-10-28 | End: 1900-01-01

## 2024-10-28 RX ORDER — FLUTICASONE PROPIONATE 50 UG/1
50 SPRAY, METERED NASAL DAILY
Qty: 1 | Refills: 1 | Status: ACTIVE | COMMUNITY
Start: 2024-10-28 | End: 1900-01-01

## 2024-10-29 LAB
25(OH)D3 SERPL-MCNC: 21.3 NG/ML
ALBUMIN SERPL ELPH-MCNC: 4.5 G/DL
ALP BLD-CCNC: 66 U/L
ALT SERPL-CCNC: 20 U/L
ANION GAP SERPL CALC-SCNC: 15 MMOL/L
APPEARANCE: CLEAR
AST SERPL-CCNC: 22 U/L
BACTERIA: NEGATIVE /HPF
BILIRUB SERPL-MCNC: 0.6 MG/DL
BILIRUBIN URINE: NEGATIVE
BLOOD URINE: NEGATIVE
BUN SERPL-MCNC: 12 MG/DL
CALCIUM SERPL-MCNC: 9.3 MG/DL
CAST: 0 /LPF
CHLORIDE SERPL-SCNC: 104 MMOL/L
CHOLEST SERPL-MCNC: 175 MG/DL
CO2 SERPL-SCNC: 22 MMOL/L
COLOR: YELLOW
CREAT SERPL-MCNC: 0.6 MG/DL
EGFR: 118 ML/MIN/1.73M2
EPITHELIAL CELLS: 3 /HPF
ESTIMATED AVERAGE GLUCOSE: 94 MG/DL
FOLATE SERPL-MCNC: 11.6 NG/ML
GLUCOSE QUALITATIVE U: NEGATIVE MG/DL
GLUCOSE SERPL-MCNC: 80 MG/DL
HBA1C MFR BLD HPLC: 4.9 %
HCT VFR BLD CALC: 42.1 %
HDLC SERPL-MCNC: 83 MG/DL
HGB BLD-MCNC: 13.4 G/DL
IRON SATN MFR SERPL: 39 %
IRON SERPL-MCNC: 132 UG/DL
KETONES URINE: NEGATIVE MG/DL
LDLC SERPL CALC-MCNC: 82 MG/DL
LEUKOCYTE ESTERASE URINE: NEGATIVE
MCHC RBC-ENTMCNC: 31.5 PG
MCHC RBC-ENTMCNC: 31.8 GM/DL
MCV RBC AUTO: 99.1 FL
MICROSCOPIC-UA: NORMAL
NITRITE URINE: NEGATIVE
NONHDLC SERPL-MCNC: 93 MG/DL
PH URINE: 6
PLATELET # BLD AUTO: 220 K/UL
POTASSIUM SERPL-SCNC: 4.3 MMOL/L
PROT SERPL-MCNC: 7.1 G/DL
PROTEIN URINE: NEGATIVE MG/DL
RBC # BLD: 4.25 M/UL
RBC # FLD: 13.4 %
RED BLOOD CELLS URINE: 8 /HPF
SODIUM SERPL-SCNC: 141 MMOL/L
SPECIFIC GRAVITY URINE: 1.02
TIBC SERPL-MCNC: 341 UG/DL
TRIGL SERPL-MCNC: 52 MG/DL
TSH SERPL-ACNC: 1.88 UIU/ML
UIBC SERPL-MCNC: 209 UG/DL
UROBILINOGEN URINE: 0.2 MG/DL
VIT B12 SERPL-MCNC: 350 PG/ML
WBC # FLD AUTO: 6.55 K/UL
WHITE BLOOD CELLS URINE: 0 /HPF

## 2024-10-30 RX ORDER — ERGOCALCIFEROL 1.25 MG/1
1.25 MG CAPSULE, LIQUID FILLED ORAL
Qty: 8 | Refills: 0 | Status: ACTIVE | COMMUNITY
Start: 2024-10-30 | End: 1900-01-01

## 2024-12-30 ENCOUNTER — APPOINTMENT (OUTPATIENT)
Dept: INTERNAL MEDICINE | Facility: CLINIC | Age: 39
End: 2024-12-30
Payer: COMMERCIAL

## 2024-12-30 DIAGNOSIS — R09.81 NASAL CONGESTION: ICD-10-CM

## 2024-12-30 DIAGNOSIS — J45.909 UNSPECIFIED ASTHMA, UNCOMPLICATED: ICD-10-CM

## 2024-12-30 PROCEDURE — 99203 OFFICE O/P NEW LOW 30 MIN: CPT | Mod: 95

## 2024-12-30 RX ORDER — AMOXICILLIN AND CLAVULANATE POTASSIUM 875; 125 MG/1; MG/1
875-125 TABLET, COATED ORAL
Qty: 14 | Refills: 0 | Status: ACTIVE | COMMUNITY
Start: 2024-12-30 | End: 1900-01-01

## 2025-01-20 ENCOUNTER — APPOINTMENT (OUTPATIENT)
Dept: ORTHOPEDIC SURGERY | Facility: CLINIC | Age: 40
End: 2025-01-20
Payer: COMMERCIAL

## 2025-01-20 VITALS — BODY MASS INDEX: 25.86 KG/M2 | WEIGHT: 137 LBS | HEIGHT: 61 IN

## 2025-01-20 DIAGNOSIS — M75.41 IMPINGEMENT SYNDROME OF RIGHT SHOULDER: ICD-10-CM

## 2025-01-20 DIAGNOSIS — M19.011 PRIMARY OSTEOARTHRITIS, RIGHT SHOULDER: ICD-10-CM

## 2025-01-20 PROCEDURE — 73030 X-RAY EXAM OF SHOULDER: CPT | Mod: RT

## 2025-01-20 PROCEDURE — 99214 OFFICE O/P EST MOD 30 MIN: CPT

## 2025-01-20 RX ORDER — MELOXICAM 15 MG/1
15 TABLET ORAL
Qty: 30 | Refills: 1 | Status: ACTIVE | COMMUNITY
Start: 2025-01-20 | End: 1900-01-01

## 2025-01-25 ENCOUNTER — APPOINTMENT (OUTPATIENT)
Dept: MRI IMAGING | Facility: CLINIC | Age: 40
End: 2025-01-25
Payer: COMMERCIAL

## 2025-01-25 PROCEDURE — 73221 MRI JOINT UPR EXTREM W/O DYE: CPT | Mod: RT

## 2025-02-03 ENCOUNTER — APPOINTMENT (OUTPATIENT)
Dept: ORTHOPEDIC SURGERY | Facility: CLINIC | Age: 40
End: 2025-02-03
Payer: COMMERCIAL

## 2025-02-03 VITALS — HEIGHT: 61 IN | BODY MASS INDEX: 25.86 KG/M2 | WEIGHT: 137 LBS

## 2025-02-03 DIAGNOSIS — S43.431D SUPERIOR GLENOID LABRUM LESION OF RIGHT SHOULDER, SUBSEQUENT ENCOUNTER: ICD-10-CM

## 2025-02-03 PROCEDURE — 99214 OFFICE O/P EST MOD 30 MIN: CPT | Mod: 25

## 2025-02-03 PROCEDURE — J3490M: CUSTOM

## 2025-02-03 PROCEDURE — 20611 DRAIN/INJ JOINT/BURSA W/US: CPT | Mod: RT

## 2025-02-03 RX ORDER — IBUPROFEN 400 MG/1
400 TABLET ORAL 3 TIMES DAILY
Qty: 90 | Refills: 0 | Status: ACTIVE | COMMUNITY
Start: 2025-02-03 | End: 1900-01-01

## 2025-03-17 ENCOUNTER — APPOINTMENT (OUTPATIENT)
Dept: ORTHOPEDIC SURGERY | Facility: CLINIC | Age: 40
End: 2025-03-17
Payer: COMMERCIAL

## 2025-03-17 VITALS — BODY MASS INDEX: 25.86 KG/M2 | HEIGHT: 61 IN | WEIGHT: 137 LBS

## 2025-03-17 DIAGNOSIS — S43.431D SUPERIOR GLENOID LABRUM LESION OF RIGHT SHOULDER, SUBSEQUENT ENCOUNTER: ICD-10-CM

## 2025-03-17 PROCEDURE — 99214 OFFICE O/P EST MOD 30 MIN: CPT

## 2025-03-17 RX ORDER — CELECOXIB 200 MG/1
200 CAPSULE ORAL
Qty: 30 | Refills: 0 | Status: ACTIVE | COMMUNITY
Start: 2025-03-17 | End: 1900-01-01

## 2025-05-19 ENCOUNTER — APPOINTMENT (OUTPATIENT)
Dept: ORTHOPEDIC SURGERY | Facility: CLINIC | Age: 40
End: 2025-05-19